# Patient Record
Sex: FEMALE | Race: WHITE | NOT HISPANIC OR LATINO | URBAN - METROPOLITAN AREA
[De-identification: names, ages, dates, MRNs, and addresses within clinical notes are randomized per-mention and may not be internally consistent; named-entity substitution may affect disease eponyms.]

---

## 2022-07-16 ENCOUNTER — WEB ENCOUNTER (OUTPATIENT)
Dept: URBAN - METROPOLITAN AREA SURGERY CENTER 18 | Facility: SURGERY CENTER | Age: 67
End: 2022-07-16

## 2022-07-18 ENCOUNTER — CLAIMS CREATED FROM THE CLAIM WINDOW (OUTPATIENT)
Dept: URBAN - METROPOLITAN AREA SURGERY CENTER 18 | Facility: SURGERY CENTER | Age: 67
End: 2022-07-18

## 2022-07-18 ENCOUNTER — TELEPHONE ENCOUNTER (OUTPATIENT)
Dept: URBAN - METROPOLITAN AREA CLINIC 92 | Facility: CLINIC | Age: 67
End: 2022-07-18

## 2022-07-18 ENCOUNTER — CLAIMS CREATED FROM THE CLAIM WINDOW (OUTPATIENT)
Dept: URBAN - METROPOLITAN AREA SURGERY CENTER 18 | Facility: SURGERY CENTER | Age: 67
End: 2022-07-18
Payer: MEDICARE

## 2022-07-18 DIAGNOSIS — Z53.8 FAILED ATTEMPTED SURGICAL PROCEDURE: ICD-10-CM

## 2022-07-18 DIAGNOSIS — Z12.11 COLON CANCER SCREENING: ICD-10-CM

## 2022-07-18 PROCEDURE — G0104 CA SCREEN;FLEXI SIGMOIDSCOPE: HCPCS | Performed by: INTERNAL MEDICINE

## 2022-07-18 PROCEDURE — G8907 PT DOC NO EVENTS ON DISCHARG: HCPCS | Performed by: INTERNAL MEDICINE

## 2023-02-16 ENCOUNTER — TELEPHONE ENCOUNTER (OUTPATIENT)
Dept: URBAN - METROPOLITAN AREA CLINIC 96 | Facility: CLINIC | Age: 68
End: 2023-02-16

## 2023-08-21 ENCOUNTER — OFFICE VISIT (OUTPATIENT)
Dept: URBAN - METROPOLITAN AREA TELEHEALTH 2 | Facility: TELEHEALTH | Age: 68
End: 2023-08-21

## 2023-08-30 ENCOUNTER — TELEPHONE ENCOUNTER (OUTPATIENT)
Dept: URBAN - METROPOLITAN AREA CLINIC 96 | Facility: CLINIC | Age: 68
End: 2023-08-30

## 2023-09-12 ENCOUNTER — TELEPHONE ENCOUNTER (OUTPATIENT)
Dept: URBAN - METROPOLITAN AREA CLINIC 96 | Facility: CLINIC | Age: 68
End: 2023-09-12

## 2023-09-15 ENCOUNTER — OFFICE VISIT (OUTPATIENT)
Dept: URBAN - METROPOLITAN AREA TELEHEALTH 2 | Facility: TELEHEALTH | Age: 68
End: 2023-09-15
Payer: MEDICARE

## 2023-09-15 ENCOUNTER — TELEPHONE ENCOUNTER (OUTPATIENT)
Dept: URBAN - METROPOLITAN AREA CLINIC 96 | Facility: CLINIC | Age: 68
End: 2023-09-15

## 2023-09-15 VITALS — BODY MASS INDEX: 21.34 KG/M2 | WEIGHT: 125 LBS | HEIGHT: 64 IN

## 2023-09-15 DIAGNOSIS — R15.1 FECAL SMEARING: ICD-10-CM

## 2023-09-15 DIAGNOSIS — K64.8 EXTERNAL HEMORRHOIDS: ICD-10-CM

## 2023-09-15 DIAGNOSIS — Z12.11 SCREEN FOR COLON CANCER: ICD-10-CM

## 2023-09-15 DIAGNOSIS — R93.5 ABNORMAL ABDOMINAL CT SCAN: ICD-10-CM

## 2023-09-15 PROCEDURE — 99213 OFFICE O/P EST LOW 20 MIN: CPT | Performed by: INTERNAL MEDICINE

## 2023-09-15 NOTE — HPI-TODAY'S VISIT:
here to discuss virtual colon results dad and PGF  from liver cancer she is concerned about this some fecal incontiennce has been going ofr years not sure if its meal related has to wear pads struggled with bowel issues her whole life is lactose intolerant hx of complex pelvic susrgery--bladder prolapse/tack/repair, she does not know what she did

## 2023-10-10 ENCOUNTER — LAB OUTSIDE AN ENCOUNTER (OUTPATIENT)
Dept: URBAN - METROPOLITAN AREA CLINIC 96 | Facility: CLINIC | Age: 68
End: 2023-10-10

## 2023-10-10 LAB
CREATININE POC: 0.6
PERFORMING LAB: (no result)

## 2024-02-08 ENCOUNTER — APPOINTMENT (RX ONLY)
Dept: URBAN - METROPOLITAN AREA CLINIC 28 | Facility: CLINIC | Age: 69
Setting detail: DERMATOLOGY
End: 2024-02-08

## 2024-02-08 DIAGNOSIS — L82.0 INFLAMED SEBORRHEIC KERATOSIS: ICD-10-CM

## 2024-02-08 DIAGNOSIS — L82.1 OTHER SEBORRHEIC KERATOSIS: ICD-10-CM

## 2024-02-08 PROBLEM — D48.5 NEOPLASM OF UNCERTAIN BEHAVIOR OF SKIN: Status: ACTIVE | Noted: 2024-02-08

## 2024-02-08 PROCEDURE — ? INVENTORY

## 2024-02-08 PROCEDURE — ? BIOPSY BY SHAVE METHOD

## 2024-02-08 PROCEDURE — 11102 TANGNTL BX SKIN SINGLE LES: CPT

## 2024-02-08 PROCEDURE — ? BENIGN DESTRUCTION COSMETIC

## 2024-02-08 ASSESSMENT — LOCATION DETAILED DESCRIPTION DERM
LOCATION DETAILED: LEFT SUPERIOR MEDIAL UPPER BACK
LOCATION DETAILED: LEFT INFERIOR UPPER BACK
LOCATION DETAILED: RIGHT INFERIOR LATERAL UPPER BACK

## 2024-02-08 ASSESSMENT — LOCATION SIMPLE DESCRIPTION DERM
LOCATION SIMPLE: LEFT UPPER BACK
LOCATION SIMPLE: RIGHT UPPER BACK

## 2024-02-08 ASSESSMENT — LOCATION ZONE DERM: LOCATION ZONE: TRUNK

## 2024-02-08 NOTE — PROCEDURE: BENIGN DESTRUCTION COSMETIC
Detail Level: Detailed
Anesthesia Volume In Cc: 0.5
Consent: The patient's consent was obtained including but not limited to risks of crusting, scabbing, blistering, scarring, darker or lighter pigmentary change, recurrence, incomplete removal and infection.
Post-Care Instructions: I reviewed with the patient in detail post-care instructions. Patient is to wear sunprotection, and avoid picking at any of the treated lesions. Pt may apply Vaseline to crusted or scabbing areas.\\n\\nWe discussed a possible \"round 2\" with treatment of cryotherapy of several of the smaller SKs of her back in several weeks or months.

## 2024-02-08 NOTE — HPI: EVALUATION OF SKIN LESION(S)
What Type Of Note Output Would You Prefer (Optional)?: Bullet Format
Hpi Title: Evaluation of a Skin Lesion
How Severe Are Your Spot(S)?: mild
Have Your Spot(S) Been Treated In The Past?: has not been treated
Additional History: Established patient is here today for biopsy on two lesions on her back.

## 2024-12-12 ENCOUNTER — APPOINTMENT (OUTPATIENT)
Dept: URBAN - METROPOLITAN AREA CLINIC 28 | Facility: CLINIC | Age: 69
Setting detail: DERMATOLOGY
End: 2024-12-12

## 2024-12-12 DIAGNOSIS — D22 MELANOCYTIC NEVI: ICD-10-CM

## 2024-12-12 DIAGNOSIS — L72.0 EPIDERMAL CYST: ICD-10-CM

## 2024-12-12 DIAGNOSIS — Z85.828 PERSONAL HISTORY OF OTHER MALIGNANT NEOPLASM OF SKIN: ICD-10-CM

## 2024-12-12 DIAGNOSIS — Z80.8 FAMILY HISTORY OF MALIGNANT NEOPLASM OF OTHER ORGANS OR SYSTEMS: ICD-10-CM

## 2024-12-12 DIAGNOSIS — L81.4 OTHER MELANIN HYPERPIGMENTATION: ICD-10-CM

## 2024-12-12 DIAGNOSIS — L82.1 OTHER SEBORRHEIC KERATOSIS: ICD-10-CM

## 2024-12-12 DIAGNOSIS — Z12.83 ENCOUNTER FOR SCREENING FOR MALIGNANT NEOPLASM OF SKIN: ICD-10-CM

## 2024-12-12 DIAGNOSIS — D18.0 HEMANGIOMA: ICD-10-CM

## 2024-12-12 PROBLEM — D22.71 MELANOCYTIC NEVI OF RIGHT LOWER LIMB, INCLUDING HIP: Status: ACTIVE | Noted: 2024-12-12

## 2024-12-12 PROBLEM — D18.01 HEMANGIOMA OF SKIN AND SUBCUTANEOUS TISSUE: Status: ACTIVE | Noted: 2024-12-12

## 2024-12-12 PROCEDURE — ? COUNSELING

## 2024-12-12 PROCEDURE — 99213 OFFICE O/P EST LOW 20 MIN: CPT

## 2024-12-12 PROCEDURE — ? OBSERVATION AND MEASURE

## 2024-12-12 PROCEDURE — ? ADDITIONAL NOTES

## 2024-12-12 PROCEDURE — ? PHOTO-DOCUMENTATION

## 2024-12-12 ASSESSMENT — LOCATION ZONE DERM
LOCATION ZONE: TRUNK
LOCATION ZONE: EYELID
LOCATION ZONE: FEET
LOCATION ZONE: FACE
LOCATION ZONE: LEG

## 2024-12-12 ASSESSMENT — LOCATION SIMPLE DESCRIPTION DERM
LOCATION SIMPLE: LEFT GUTEAL CREASE
LOCATION SIMPLE: LEFT EYEBROW
LOCATION SIMPLE: RIGHT UPPER BACK
LOCATION SIMPLE: UPPER BACK
LOCATION SIMPLE: LEFT CHEEK
LOCATION SIMPLE: RIGHT CHEEK
LOCATION SIMPLE: LEFT EYELID
LOCATION SIMPLE: RIGHT PLANTAR SURFACE

## 2024-12-12 ASSESSMENT — LOCATION DETAILED DESCRIPTION DERM
LOCATION DETAILED: INFERIOR THORACIC SPINE
LOCATION DETAILED: LEFT MEDIAL MALAR CHEEK
LOCATION DETAILED: RIGHT PLANTAR FOREFOOT OVERLYING 2ND METATARSAL
LOCATION DETAILED: RIGHT MEDIAL MALAR CHEEK
LOCATION DETAILED: LEFT CENTRAL EYEBROW
LOCATION DETAILED: LEFT LATERAL CANTHUS
LOCATION DETAILED: RIGHT MID-UPPER BACK
LOCATION DETAILED: LEFT GLUTEAL CREASE

## 2024-12-12 NOTE — HPI: FULL BODY SKIN EXAMINATION
What Is The Reason For Today's Visit?: Full Body Skin Examination
What Is The Reason For Today's Visit? (Being Monitored For X): concerning skin lesions on an annual basis
Additional History: Dr. Barahona is her derm surgeon

## 2024-12-12 NOTE — PROCEDURE: OBSERVATION
Detail Level: Detailed
Size Of Lesion: 3 x 5 mm
Morphology Per Location (Optional): Stuck on and brown
Size Of Lesion: 1.5 x 3 mm

## 2024-12-12 NOTE — PROCEDURE: COUNSELING
Detail Level: Generalized
Skin Checks Recommendations: SSE q month\\nFBSE with derm q year with 6 months sun exposed check
Detail Level: Detailed
Patient Specific Counseling (Will Not Stick From Patient To Patient): -\\n\\nLooks like classic milia. Should dry up and go away. However discussed what basal cell carcinoma looks like so if the spot changes or patient is concerned to return to clinic.
Detail Level: Zone
Nicotinamide Supplementation Recommendations: Continue 500mg BID
Topical Retinoids Recommendations: Begin with otc Gold Lakhani with Retinol to arms and legs at HS\\nConsider Rx Tretinoin, discussed
Patient Specific Counseling (Will Not Stick From Patient To Patient): --\\nher daughter had an early melanoma on her arm years ago and is dong well after just excision

## 2024-12-12 NOTE — HPI: OTHER
Condition:: SKs
Please Describe Your Condition:: The patient has lots of tan crusty growths of her trunk and shoulders that are old and mostly stable. She thinks some have gotten slowly larger over time, but no symptoms and no unusual ones that she has seen.

## 2024-12-12 NOTE — PROCEDURE: ADDITIONAL NOTES
Detail Level: Simple
Render Risk Assessment In Note?: no
Additional Notes: Lots of crusty Seborrheic keratoses in the area of the back but this one is  a little darker brown than her others. Showed the patient with mirror. Discussed can observe versus biopsy it. Patient wants to just observe for now and RTC if any change or symptom.\\n\\nThe other scattered classic tan SKs all look fine. Risks with removal reviewed and the patient agrees with observation and RTC if any unusual one.

## 2024-12-12 NOTE — HPI: EVALUATION OF SKIN LESION(S)
Hpi Title: Evaluation of Skin Lesions
Additional History: \\nVery small spot below the right eye that is reminding her of the basal cell carcinoma she had\\n\\nSmall spot on the left eyelid area that she wants to make sure is okay

## 2025-04-07 ENCOUNTER — DASHBOARD ENCOUNTERS (OUTPATIENT)
Age: 70
End: 2025-04-07

## 2025-04-07 ENCOUNTER — LAB OUTSIDE AN ENCOUNTER (OUTPATIENT)
Dept: URBAN - METROPOLITAN AREA CLINIC 96 | Facility: CLINIC | Age: 70
End: 2025-04-07

## 2025-04-07 ENCOUNTER — OFFICE VISIT (OUTPATIENT)
Dept: URBAN - METROPOLITAN AREA CLINIC 96 | Facility: CLINIC | Age: 70
End: 2025-04-07
Payer: MEDICARE

## 2025-04-07 DIAGNOSIS — R63.4 WEIGHT LOSS: ICD-10-CM

## 2025-04-07 DIAGNOSIS — K64.9 HEMORRHOIDS, UNSPECIFIED HEMORRHOID TYPE: ICD-10-CM

## 2025-04-07 DIAGNOSIS — R10.13 POSTPRANDIAL EPIGASTRIC PAIN: ICD-10-CM

## 2025-04-07 DIAGNOSIS — R15.1 FECAL SMEARING: ICD-10-CM

## 2025-04-07 DIAGNOSIS — R19.5 STOOL MUCUS: ICD-10-CM

## 2025-04-07 PROBLEM — 271864008: Status: ACTIVE | Noted: 2025-04-07

## 2025-04-07 PROBLEM — 70153002: Status: ACTIVE | Noted: 2025-04-07

## 2025-04-07 PROBLEM — 225593006: Status: ACTIVE | Noted: 2025-04-07

## 2025-04-07 PROBLEM — 262285001: Status: ACTIVE | Noted: 2025-04-07

## 2025-04-07 PROBLEM — 79922009: Status: ACTIVE | Noted: 2025-04-07

## 2025-04-07 PROCEDURE — 99214 OFFICE O/P EST MOD 30 MIN: CPT

## 2025-04-07 RX ORDER — PANTOPRAZOLE SODIUM 20 MG/1
2 TABLETS 2 TIMES A DAY FOR 10 DAYS, 1 TABLET 2 TIMES A DAY FOR 10 DAYS, 1 TABLET ONCE A DAY FOR 10 DAYS TABLET, DELAYED RELEASE ORAL
Qty: 70 TABLET | Refills: 0 | OUTPATIENT
Start: 2025-04-07

## 2025-04-07 NOTE — HPI-OTHER HISTORIES
Previously 2023 with Dr. Ortiz: here to discuss virtual colon results dad and PGF  from liver cancer she is concerned about this some fecal incontiennce has been going ofr years not sure if its meal related has to wear pads struggled with bowel issues her whole life is lactose intolerant hx of complex pelvic susrgery--bladder prolapse/tack/repair, she does not know what she did

## 2025-04-07 NOTE — HPI-TODAY'S VISIT:
69 year old female presents with her , Barry, for evaluation of abdominal pain. Referred by PCP, Dr. Moreno - a copy of this note will be sent to provider. . "Bowel issues all my life" 3 years ago - things started progressing 3 months with epigastric pain all the time that moves down into lower abdomen.  Moves down with her food digesting. Pain every times she eats.  Doesn't matter what she eats.  Tried clean eating, tried FODMAP, tried low lactose, tried low gluten. "A lot of mucus" even when not having a BM. BMs - 7 per day or once a day.  Stool is soft. BSS 6.  No hard stool. No straining.  BRB with every BM. Lost some weight due to food aversion.  states they visited [facility] prior to onset of symptoms. Stool studies were ordered by PCP last week - but have not been submitted at this time. Still c/o fecal leakage - has to wear a pad daily.  Not full incontinence of feces.  No sensation to have a BM.

## 2025-04-08 ENCOUNTER — LAB OUTSIDE AN ENCOUNTER (OUTPATIENT)
Dept: URBAN - METROPOLITAN AREA CLINIC 96 | Facility: CLINIC | Age: 70
End: 2025-04-08

## 2025-04-14 ENCOUNTER — TELEPHONE ENCOUNTER (OUTPATIENT)
Dept: URBAN - METROPOLITAN AREA CLINIC 96 | Facility: CLINIC | Age: 70
End: 2025-04-14

## 2025-04-14 LAB
ADENOVIRUS F 40/41: NOT DETECTED
CALPROTECTIN, STOOL - QDX: (no result)
CAMPYLOBACTER: NOT DETECTED
CLOSTRIDIUM DIFFICILE: NOT DETECTED
ENTAMOEBA HISTOLYTICA: NOT DETECTED
ENTEROAGGREGATIVE E.COLI: NOT DETECTED
ENTEROTOXIGENIC E.COLI: NOT DETECTED
ESCHERICHIA COLI O157: NOT DETECTED
GIARDIA LAMBLIA: NOT DETECTED
H. PYLORI (EIA) - QDX: NEGATIVE
NOROVIRUS GI/GII: NOT DETECTED
OVA AND PARASITE - QDX: (no result)
PANCREATICELASTASE ELISA, STOOL: (no result)
ROTAVIRUS A: NOT DETECTED
SALMONELLA SPP.: NOT DETECTED
SHIGA-LIKE TOXIN PRODUCING E.COLI: NOT DETECTED
SHIGELLA SPP. / ENTEROINVASIVE E.COLI: NOT DETECTED
VIBRIO PARAHAEMOLYTICUS: NOT DETECTED
VIBRIO SPP.: NOT DETECTED
YERSINIA ENTEROCOLITICA: NOT DETECTED

## 2025-04-23 ENCOUNTER — TELEPHONE ENCOUNTER (OUTPATIENT)
Dept: URBAN - METROPOLITAN AREA CLINIC 96 | Facility: CLINIC | Age: 70
End: 2025-04-23

## 2025-05-12 ENCOUNTER — OFFICE VISIT (OUTPATIENT)
Dept: URBAN - METROPOLITAN AREA CLINIC 96 | Facility: CLINIC | Age: 70
End: 2025-05-12
Payer: MEDICARE

## 2025-05-12 ENCOUNTER — LAB OUTSIDE AN ENCOUNTER (OUTPATIENT)
Dept: URBAN - METROPOLITAN AREA CLINIC 96 | Facility: CLINIC | Age: 70
End: 2025-05-12

## 2025-05-12 DIAGNOSIS — K64.9 HEMORRHOIDS, UNSPECIFIED HEMORRHOID TYPE: ICD-10-CM

## 2025-05-12 DIAGNOSIS — R15.1 FECAL SMEARING: ICD-10-CM

## 2025-05-12 DIAGNOSIS — R10.13 POSTPRANDIAL EPIGASTRIC PAIN: ICD-10-CM

## 2025-05-12 DIAGNOSIS — R63.4 WEIGHT LOSS: ICD-10-CM

## 2025-05-12 DIAGNOSIS — R19.5 STOOL MUCUS: ICD-10-CM

## 2025-05-12 LAB
ABSOLUTE BASOPHIL COUNT: 0.04
ABSOLUTE EOSINOPHIL COUNT: 0.08
ABSOLUTE IMMATURE GRANULOCYTE  COUNT: 0.02
ABSOLUTE LYMPHOCYTE COUNT: 1.69
ABSOLUTE MONOCYTE COUNT: 0.66
ABSOLUTE NEUTROPHIL COUNT (ANC): 3.76
ABSOLUTE NRBC  COUNT: 0
AG RATIO: 2
ALBUMIN LEVEL: 4.6
ALK PHOS: 62
ALT: 11
ANION GAP: 9
AST: 22
BASOPHIL AUTO: 1
BILIRUBIN TOTAL: 0.7
BUN/CREAT RATIO: 24
BUN: 17
CALCIUM LEVEL: 10
CHLORIDE LEVEL: 103
CO2 LEVEL: 26
CREATININE LEVEL: 0.7
EOS AUTO: 1
GFR 2021: >60
GLUCOSE LEVEL: 84
HCT: 38.2
HGB: 12.3
IMMATURE GRANULOCYTES AUTO: 0.3
LYMPH AUTO: 27
MCH: 28.9
MCHC: 32.2
MCV: 89.7
MONO AUTO: 11
MPV: 10
NEUTRO AUTO: 60
NRBC AUTO: 0
OSMO (CALC): 276
PERFORMING LAB: (no result)
PLATELETS: 291
POTASSIUM LEVEL: 4.5
PROTEIN TOTAL: 6.5
RBC: 4.26
RDW: 12.7
SODIUM LEVEL: 138
T4 FREE: 1.3
TSH: 1.16
WBC: 6.2

## 2025-05-12 PROCEDURE — 99215 OFFICE O/P EST HI 40 MIN: CPT

## 2025-05-12 RX ORDER — PANTOPRAZOLE SODIUM 20 MG/1
1 TABLET 1/2 TO 1 HOUR BEFORE MORNING MEAL TABLET, DELAYED RELEASE ORAL ONCE A DAY
Qty: 30 | Refills: 1 | OUTPATIENT
Start: 2025-05-12

## 2025-05-12 RX ORDER — PANTOPRAZOLE SODIUM 20 MG/1
2 TABLETS 2 TIMES A DAY FOR 10 DAYS, 1 TABLET 2 TIMES A DAY FOR 10 DAYS, 1 TABLET ONCE A DAY FOR 10 DAYS TABLET, DELAYED RELEASE ORAL
Qty: 70 TABLET | Refills: 0 | Status: ACTIVE | COMMUNITY
Start: 2025-04-07

## 2025-05-12 NOTE — HPI-OTHER HISTORIES
Previously 2025 with AMRIT Estrada: 69 year old female presents with her , Barry, for evaluation of abdominal pain. Referred by PCP, Dr. Moreno - a copy of this note will be sent to provider. . "Bowel issues all my life" 3 years ago - things started progressing 3 months with epigastric pain all the time that moves down into lower abdomen.  Moves down with her food digesting. Pain every times she eats.  Doesn't matter what she eats.  Tried clean eating, tried FODMAP, tried low lactose, tried low gluten. "A lot of mucus" even when not having a BM. BMs - 7 per day or once a day.  Stool is soft. BSS 6.  No hard stool. No straining.  BRB with every BM. Lost some weight due to food aversion.  states they visited [facility] prior to onset of symptoms. Stool studies were ordered by PCP last week - but have not been submitted at this time. Still c/o fecal leakage - has to wear a pad daily.  Not full incontinence of feces.  No sensation to have a BM.  Previously 2023 with Dr. Ortiz: here to discuss virtual colon results dad and PGF  from liver cancer she is concerned about this some fecal incontiennce has been going ofr years not sure if its meal related has to wear pads struggled with bowel issues her whole life is lactose intolerant hx of complex pelvic susrgery--bladder prolapse/tack/repair, she does not know what she did

## 2025-05-12 NOTE — HPI-TODAY'S VISIT:
69 year old female presents with her  for follow-up regarding epigastric pain and change in bowel habits At last visit, she was complaining of post-prandial pain and food aversion due to this. After PPI taper, she no longer has epigastric pain associated with meals. Believes she has little bit more energy than before. Still with some abdominal pain - intermittent in different places.  RUQ, LUQ.  Can be sharp or dull. Not actively avoiding food any more.  Had plenty of food recently with her grandchildren. Was suprised with her weight today - which was down. Lots of mucus in stool recently.  Still with fecal leakage  - has to wear a pad. . Per  -  pattern is the same as before.  Pain has improved.  Nausea comes and goes but less than prio. . Stool studies, 4/2025: No evidence of infection, including H. pylori.  Calprotectin and pancreatic elastase within normal limits.  No evidence of ova and parasites. . RUQ US, 4/2025: Unremarkable.

## 2025-05-13 ENCOUNTER — TELEPHONE ENCOUNTER (OUTPATIENT)
Dept: URBAN - METROPOLITAN AREA CLINIC 96 | Facility: CLINIC | Age: 70
End: 2025-05-13

## 2025-07-11 ENCOUNTER — TELEPHONE ENCOUNTER (OUTPATIENT)
Dept: URBAN - METROPOLITAN AREA CLINIC 96 | Facility: CLINIC | Age: 70
End: 2025-07-11

## 2025-07-22 ENCOUNTER — OFFICE VISIT (OUTPATIENT)
Dept: URBAN - METROPOLITAN AREA MEDICAL CENTER 28 | Facility: MEDICAL CENTER | Age: 70
End: 2025-07-22